# Patient Record
Sex: MALE | Race: WHITE | URBAN - METROPOLITAN AREA
[De-identification: names, ages, dates, MRNs, and addresses within clinical notes are randomized per-mention and may not be internally consistent; named-entity substitution may affect disease eponyms.]

---

## 2019-05-16 ENCOUNTER — TELEPHONE (OUTPATIENT)
Dept: FAMILY MEDICINE CLINIC | Facility: CLINIC | Age: 29
End: 2019-05-16

## 2019-05-16 NOTE — TELEPHONE ENCOUNTER
Received medical records request from patient requesting all medical records for continuation of care. Patient's chart has been pulled from storage and sent with authorization to [employer].  [facility] [phone]

## 2019-06-19 NOTE — PROGRESS NOTES
Aleksandar Ruffin is a 25year old male. S:  Patient presents today with the following concerns:  · No medications currently. Was a previous patient of Shamika Byrne. Has moved frequently as has been in the [employer].     · Interested in [facility] [place] denies chest pain on exertion  GI: denies abdominal pain.   No N/V/D/C  : denies dysuria  MUSCULOSKELETAL: denies back pain  NEURO: denies headaches    EXAM:  /84   Pulse 84   Temp 97.1 °F (36.2 °C)   Resp 16   Ht 67.25\"   Wt 174 lb 9.6 oz   BMI 27

## 2019-07-31 NOTE — PROGRESS NOTES
Berenice Gallagher is a 25year old male. HPI:   Patient presents for recheck of his ADHD. Patient has been using the stimulant medication, Adderall XR 20 mg, on a regular basis. Patient was last seen 2 months ago.   Medication changes at that time:  [facility] No        REVIEW OF SYSTEMS:   GENERAL HEALTH: feels well otherwise  RESPIRATORY: denies shortness of breath with exertion  CARDIOVASCULAR: denies chest pain, denies palpitations  GI: denies abdominal pain  NEURO: denies headaches    EXAM:   /78   Pu

## 2019-12-06 NOTE — PROGRESS NOTES
Pamela Garibay is a 30year old male. HPI:   Patient presents for recheck of his ADHD. Patient has been using the stimulant medication, Adderall XR 30 mg, on a regular basis. Patient was last seen 4 months ago.   Medication changes at that time:  [place] Drug use: No        REVIEW OF SYSTEMS:   GENERAL HEALTH: feels well otherwise  RESPIRATORY: denies shortness of breath with exertion  CARDIOVASCULAR: denies chest pain, denies palpitations  GI: denies abdominal pain  NEURO: denies headaches    EXAM:   BP 1

## 2020-04-23 NOTE — PROGRESS NOTES
Virtual Telephone Check-In    Daxa Dawn verbally consents to a Virtual/Telephone Check-In visit on 04/23/20. Patient understands and accepts financial responsibility for any deductible, co-insurance and/or co-pays associated with this service.

## 2020-07-31 NOTE — PROGRESS NOTES
Virtual Telephone Check-In    Faiza Prado verbally consents to a Virtual/Telephone Check-In visit on 07/31/20. Patient has been referred to the [facility] website at [email or web address] to review the yearly Consent to Treat document.     Patient underst

## 2021-01-07 NOTE — PROGRESS NOTES
Virtual Telephone Check-In    Faiza Prado verbally consents to a Virtual/Telephone Check-In visit on 01/07/21. Patient has been referred to the [facility] website at [email or web address] to review the yearly Consent to Treat document.     Patient underst

## 2021-05-10 ENCOUNTER — LAB ENCOUNTER (OUTPATIENT)
Dept: LAB | Age: 31
End: 2021-05-10
Attending: PHYSICIAN ASSISTANT
Payer: COMMERCIAL

## 2021-05-10 DIAGNOSIS — Z00.00 LABORATORY EXAMINATION ORDERED AS PART OF A COMPLETE PHYSICAL EXAMINATION: ICD-10-CM

## 2021-05-10 PROCEDURE — 80050 GENERAL HEALTH PANEL: CPT | Performed by: PHYSICIAN ASSISTANT

## 2021-05-10 PROCEDURE — 80061 LIPID PANEL: CPT | Performed by: PHYSICIAN ASSISTANT

## 2021-05-10 NOTE — PROGRESS NOTES
Patient presents with:  ADHD: 3 month adderall follow up       [facility]  Billy Tabares is a 26year old year old male who presents to clinic for ADD follow up. He has been taking adderall XR 25 mg and IR 10 mg daily.  Working well for hi level: Not on file    Tobacco Use      Smoking status: Former Smoker        Packs/day: 0.30        Types: Cigarettes        Start date: 6/20/2019      Smokeless tobacco: Never Used      Tobacco comment: pack a month    Vaping Use      Vaping Use: Never use

## 2021-05-14 ENCOUNTER — IMMUNIZATION (OUTPATIENT)
Dept: LAB | Facility: HOSPITAL | Age: 31
End: 2021-05-14
Attending: EMERGENCY MEDICINE
Payer: COMMERCIAL

## 2021-05-14 DIAGNOSIS — Z23 NEED FOR VACCINATION: Primary | ICD-10-CM

## 2021-05-14 PROCEDURE — 0001A SARSCOV2 VAC 30MCG/0.3ML IM: CPT

## 2021-06-05 ENCOUNTER — IMMUNIZATION (OUTPATIENT)
Dept: LAB | Facility: HOSPITAL | Age: 31
End: 2021-06-05
Attending: EMERGENCY MEDICINE
Payer: COMMERCIAL

## 2021-06-05 DIAGNOSIS — Z23 NEED FOR VACCINATION: Primary | ICD-10-CM

## 2021-06-05 PROCEDURE — 0002A SARSCOV2 VAC 30MCG/0.3ML IM: CPT

## 2021-08-11 NOTE — PROGRESS NOTES
Patient presents with:  ADHD: 3 month f/u Adderall  Abdominal Pain: c/o periodic epigastric pain beginning 1 1/2 weeks ago,disturbing sleep. Alternating between diarrhea and constipation       HISTORY OF PRESENT ILLNESS  Clayton Dukes is a 33year old mal hepatosplenomegaly. No CVA tenderness. PSYCH pleasant and cooperative. Normal thought process and content. ASSESSMENT/ PLAN  1. ADHD  [place] med[place] x [place] sooner as needed.     2. Epigastric pain  Suspect PUD/ gastritis

## 2021-09-09 ENCOUNTER — OFFICE VISIT (OUTPATIENT)
Dept: FAMILY MEDICINE CLINIC | Facility: CLINIC | Age: 31
End: 2021-09-09
Payer: COMMERCIAL

## 2021-09-09 ENCOUNTER — LAB ENCOUNTER (OUTPATIENT)
Dept: LAB | Age: 31
End: 2021-09-09
Attending: FAMILY MEDICINE
Payer: COMMERCIAL

## 2021-09-09 VITALS
DIASTOLIC BLOOD PRESSURE: 68 MMHG | WEIGHT: 161 LBS | RESPIRATION RATE: 16 BRPM | HEART RATE: 72 BPM | SYSTOLIC BLOOD PRESSURE: 100 MMHG | TEMPERATURE: 99 F | HEIGHT: 68 IN | BODY MASS INDEX: 24.4 KG/M2

## 2021-09-09 DIAGNOSIS — R10.13 EPIGASTRIC PAIN: Primary | ICD-10-CM

## 2021-09-09 DIAGNOSIS — R10.13 EPIGASTRIC PAIN: ICD-10-CM

## 2021-09-09 PROCEDURE — 3008F BODY MASS INDEX DOCD: CPT | Performed by: PHYSICIAN ASSISTANT

## 2021-09-09 PROCEDURE — 87338 HPYLORI STOOL AG IA: CPT | Performed by: PHYSICIAN ASSISTANT

## 2021-09-09 PROCEDURE — 3078F DIAST BP <80 MM HG: CPT | Performed by: PHYSICIAN ASSISTANT

## 2021-09-09 PROCEDURE — 99213 OFFICE O/P EST LOW 20 MIN: CPT | Performed by: PHYSICIAN ASSISTANT

## 2021-09-09 PROCEDURE — 3074F SYST BP LT 130 MM HG: CPT | Performed by: PHYSICIAN ASSISTANT

## 2021-09-09 NOTE — PROGRESS NOTES
Patient presents with:  Abdominal Pain: x1 month       HISTORY OF PRESENT ILLNESS  Jenny Lowery is a 33year old male who presents for alternating abdominal pains. RUQ vs epigastric vs mid abdomen. Sometimes seems to affect appetite.  Thought that the om pain  Will check H pylori and let him know results once they return. Needs to keep monitoring for potential food triggers. Notify us if he gets any new symptoms. Patient expresses understanding and agreement with above plan.   Rosario Cohen PA-C    Addm:

## 2021-09-11 LAB — HELICOBACTER PYLORI AG, FECAL: POSITIVE

## 2021-11-15 NOTE — PROGRESS NOTES
Patient presents with:  ADHD      HISTORY OF PRESENT ILLNESS  Esteban Martinez is a 33year old year old male who presents to clinic for ADD follow up. He has been on Adderall ER 25 mg for some time.  He has been doing well on his medications and has been ta Exercise: No        Seat Belt: Yes      Wt Readings from Last 6 Encounters:  11/11/21 : 160 lb (72.6 kg)  09/09/21 : 161 lb (73 kg)  08/11/21 : 159 lb 3.2 oz (72.2 kg)  05/06/21 : 169 lb 6 oz (76.8 kg)  12/06/19 : 165 lb 6.4 oz (75 kg)  07/31/19 : 169 l

## 2022-02-10 ENCOUNTER — TELEPHONE (OUTPATIENT)
Dept: FAMILY MEDICINE CLINIC | Facility: CLINIC | Age: 32
End: 2022-02-10

## 2022-02-10 DIAGNOSIS — Z00.00 LABORATORY EXAMINATION ORDERED AS PART OF A COMPLETE PHYSICAL EXAMINATION: Primary | ICD-10-CM

## 2022-02-10 NOTE — TELEPHONE ENCOUNTER
Please enter lab orders for the patient's upcoming physical appointment. Physical scheduled: Your appointments     Date & Time Appointment Department [facility])    Feb 17, 2022  8:00 AM CST Physical - Established with CHRISTOPHER Silverman [facility], [address], [place]  ([facility])            [address] [facility] [address] [place] [address] [phone]         [facility]: [facility])     The patient has been notified to complete fasting labs prior to their physical appointment.

## 2022-02-14 ENCOUNTER — LAB ENCOUNTER (OUTPATIENT)
Dept: LAB | Age: 32
End: 2022-02-14
Attending: PHYSICIAN ASSISTANT
Payer: COMMERCIAL

## 2022-02-14 DIAGNOSIS — Z00.00 LABORATORY EXAMINATION ORDERED AS PART OF A COMPLETE PHYSICAL EXAMINATION: ICD-10-CM

## 2022-02-14 LAB
ALBUMIN SERPL-MCNC: 3.8 G/DL (ref 3.4–5)
ALBUMIN/GLOB SERPL: 1.2 {RATIO} (ref 1–2)
ALP LIVER SERPL-CCNC: 48 U/L
ALT SERPL-CCNC: 18 U/L
ANION GAP SERPL CALC-SCNC: 4 MMOL/L (ref 0–18)
AST SERPL-CCNC: 12 U/L (ref 15–37)
BASOPHILS # BLD AUTO: 0.06 X10(3) UL (ref 0–0.2)
BASOPHILS NFR BLD AUTO: 0.9 %
BILIRUB SERPL-MCNC: 0.8 MG/DL (ref 0.1–2)
BUN BLD-MCNC: 8 MG/DL (ref 7–18)
CALCIUM BLD-MCNC: 9.2 MG/DL (ref 8.5–10.1)
CHLORIDE SERPL-SCNC: 109 MMOL/L (ref 98–112)
CHOLEST SERPL-MCNC: 207 MG/DL (ref ?–200)
CO2 SERPL-SCNC: 30 MMOL/L (ref 21–32)
CREAT BLD-MCNC: 1.09 MG/DL
EOSINOPHIL # BLD AUTO: 0.15 X10(3) UL (ref 0–0.7)
EOSINOPHIL NFR BLD AUTO: 2.3 %
ERYTHROCYTE [DISTWIDTH] IN BLOOD BY AUTOMATED COUNT: 12.7 %
FASTING PATIENT LIPID ANSWER: YES
FASTING STATUS PATIENT QL REPORTED: YES
GLOBULIN PLAS-MCNC: 3.1 G/DL (ref 2.8–4.4)
GLUCOSE BLD-MCNC: 84 MG/DL (ref 70–99)
HCT VFR BLD AUTO: 47.4 %
HDLC SERPL-MCNC: 57 MG/DL (ref 40–59)
HGB BLD-MCNC: 15.6 G/DL
IMM GRANULOCYTES # BLD AUTO: 0.02 X10(3) UL (ref 0–1)
IMM GRANULOCYTES NFR BLD: 0.3 %
LDLC SERPL CALC-MCNC: 131 MG/DL (ref ?–100)
LYMPHOCYTES # BLD AUTO: 2.88 X10(3) UL (ref 1–4)
LYMPHOCYTES NFR BLD AUTO: 44.6 %
MCH RBC QN AUTO: 29.3 PG (ref 26–34)
MCHC RBC AUTO-ENTMCNC: 32.9 G/DL (ref 31–37)
MONOCYTES # BLD AUTO: 0.58 X10(3) UL (ref 0.1–1)
MONOCYTES NFR BLD AUTO: 9 %
NEUTROPHILS # BLD AUTO: 2.77 X10 (3) UL (ref 1.5–7.7)
NEUTROPHILS # BLD AUTO: 2.77 X10(3) UL (ref 1.5–7.7)
NEUTROPHILS NFR BLD AUTO: 42.9 %
NONHDLC SERPL-MCNC: 150 MG/DL (ref ?–130)
OSMOLALITY SERPL CALC.SUM OF ELEC: 294 MOSM/KG (ref 275–295)
PLATELET # BLD AUTO: 254 10(3)UL (ref 150–450)
POTASSIUM SERPL-SCNC: 4.1 MMOL/L (ref 3.5–5.1)
PROT SERPL-MCNC: 6.9 G/DL (ref 6.4–8.2)
RBC # BLD AUTO: 5.32 X10(6)UL
SODIUM SERPL-SCNC: 143 MMOL/L (ref 136–145)
TRIGL SERPL-MCNC: 106 MG/DL (ref 30–149)
TSI SER-ACNC: 2.13 MIU/ML (ref 0.36–3.74)
VLDLC SERPL CALC-MCNC: 19 MG/DL (ref 0–30)
WBC # BLD AUTO: 6.5 X10(3) UL (ref 4–11)

## 2022-02-14 PROCEDURE — 80050 GENERAL HEALTH PANEL: CPT | Performed by: PHYSICIAN ASSISTANT

## 2022-02-14 PROCEDURE — 80061 LIPID PANEL: CPT | Performed by: PHYSICIAN ASSISTANT

## 2022-02-17 ENCOUNTER — OFFICE VISIT (OUTPATIENT)
Dept: FAMILY MEDICINE CLINIC | Facility: CLINIC | Age: 32
End: 2022-02-17
Payer: COMMERCIAL

## 2022-02-17 VITALS
RESPIRATION RATE: 16 BRPM | HEIGHT: 67.2 IN | WEIGHT: 162 LBS | SYSTOLIC BLOOD PRESSURE: 100 MMHG | HEART RATE: 90 BPM | DIASTOLIC BLOOD PRESSURE: 66 MMHG | TEMPERATURE: 98 F | BODY MASS INDEX: 25.13 KG/M2

## 2022-02-17 DIAGNOSIS — Z00.00 WELL ADULT EXAM: Primary | ICD-10-CM

## 2022-02-17 DIAGNOSIS — H93.12 TINNITUS OF LEFT EAR: ICD-10-CM

## 2022-02-17 DIAGNOSIS — F90.0 ATTENTION DEFICIT HYPERACTIVITY DISORDER (ADHD), PREDOMINANTLY INATTENTIVE TYPE: ICD-10-CM

## 2022-02-17 PROCEDURE — 3008F BODY MASS INDEX DOCD: CPT | Performed by: PHYSICIAN ASSISTANT

## 2022-02-17 PROCEDURE — 3074F SYST BP LT 130 MM HG: CPT | Performed by: PHYSICIAN ASSISTANT

## 2022-02-17 PROCEDURE — 3078F DIAST BP <80 MM HG: CPT | Performed by: PHYSICIAN ASSISTANT

## 2022-02-17 PROCEDURE — 99395 PREV VISIT EST AGE 18-39: CPT | Performed by: PHYSICIAN ASSISTANT

## 2022-02-17 RX ORDER — DEXTROAMPHETAMINE SACCHARATE, AMPHETAMINE ASPARTATE MONOHYDRATE, DEXTROAMPHETAMINE SULFATE AND AMPHETAMINE SULFATE 6.25; 6.25; 6.25; 6.25 MG/1; MG/1; MG/1; MG/1
25 CAPSULE, EXTENDED RELEASE ORAL DAILY
Qty: 30 CAPSULE | Refills: 0 | Status: SHIPPED | OUTPATIENT
Start: 2022-02-17 | End: 2022-03-19

## 2022-02-17 RX ORDER — DEXTROAMPHETAMINE SACCHARATE, AMPHETAMINE ASPARTATE MONOHYDRATE, DEXTROAMPHETAMINE SULFATE AND AMPHETAMINE SULFATE 6.25; 6.25; 6.25; 6.25 MG/1; MG/1; MG/1; MG/1
25 CAPSULE, EXTENDED RELEASE ORAL DAILY
Qty: 30 CAPSULE | Refills: 0 | Status: CANCELLED | OUTPATIENT
Start: 2022-02-17 | End: 2022-03-19

## 2022-02-17 RX ORDER — DEXTROAMPHETAMINE SACCHARATE, AMPHETAMINE ASPARTATE MONOHYDRATE, DEXTROAMPHETAMINE SULFATE AND AMPHETAMINE SULFATE 6.25; 6.25; 6.25; 6.25 MG/1; MG/1; MG/1; MG/1
25 CAPSULE, EXTENDED RELEASE ORAL DAILY
Qty: 30 CAPSULE | Refills: 0 | Status: SHIPPED | OUTPATIENT
Start: 2022-03-20 | End: 2022-04-20

## 2022-02-17 RX ORDER — DEXTROAMPHETAMINE SACCHARATE, AMPHETAMINE ASPARTATE MONOHYDRATE, DEXTROAMPHETAMINE SULFATE AND AMPHETAMINE SULFATE 6.25; 6.25; 6.25; 6.25 MG/1; MG/1; MG/1; MG/1
25 CAPSULE, EXTENDED RELEASE ORAL DAILY
Qty: 30 CAPSULE | Refills: 0 | Status: SHIPPED | OUTPATIENT
Start: 2022-04-20 | End: 2022-05-20

## 2022-06-01 ENCOUNTER — TELEMEDICINE (OUTPATIENT)
Dept: FAMILY MEDICINE CLINIC | Facility: CLINIC | Age: 32
End: 2022-06-01
Payer: COMMERCIAL

## 2022-06-01 DIAGNOSIS — U07.1 COVID-19 VIRUS INFECTION: Primary | ICD-10-CM

## 2022-06-01 PROCEDURE — 99213 OFFICE O/P EST LOW 20 MIN: CPT | Performed by: FAMILY MEDICINE

## 2022-06-01 NOTE — PROGRESS NOTES
Zhao Vazquez is a 33year old male coming in for had concerns including Covid (tested postive, would like to discuss about how to get better and what precaution that will be taken). Subjective:   HPI ST and starting to feel better. Swelling in throat. Strep in sister and frind had positive COVVID test. Positive Sunday as well. Subday AM noted symptoms. ROS: GENERAL HEALTH: feels well otherwise  This visit is conducted using Telemedicine with live, interactive video and audio. Patient has been referred to the [facility] website at [email or web address] to review the yearly Consent to Treat document. Patient understands and accepts financial responsibility for any deductible, co-insurance and/or co-pays associated with this service. Objective: There were no vitals taken for this visit. There is no height or weight on file to calculate BMI. Physical Exam  Constitutional:       Appearance: He is well-developed. HENT:      Head: Normocephalic and atraumatic. Pulmonary:      Effort: Pulmonary effort is normal. No respiratory distress. Musculoskeletal:         General: Normal range of motion. Cervical back: Normal range of motion. Skin:     Findings: No rash. Neurological:      Mental Status: He is alert and oriented to person, place, and time. Psychiatric:         Mood and Affect: Mood normal.         Behavior: Behavior normal.         Thought Content: Thought content normal.         Judgment: Judgment normal.           Assessment & Plan:   1. COVID-19 virus infection (Primary)     I am having Jeanette Vilchisu maintain his Amphetamine-Dextroamphet ER, Amphetamine-Dextroamphet ER, and Amphetamine-Dextroamphet ER. Early symptoms, he appears to be doing well and does not need any specific notes for work or home. Isolation is recommended and follow-up as previously scheduled for his ADHD  No follow-ups on file.     Erna Najera MD, 6/1/2022, 9:56 AM

## 2022-09-16 RX ORDER — ATOMOXETINE 40 MG/1
40 CAPSULE ORAL DAILY
Qty: 30 CAPSULE | Refills: 0 | OUTPATIENT
Start: 2022-09-16

## 2022-10-11 RX ORDER — ATOMOXETINE 60 MG/1
60 CAPSULE ORAL
Qty: 30 CAPSULE | Refills: 0 | Status: SHIPPED | OUTPATIENT
Start: 2022-10-11 | End: 2022-11-10

## 2022-11-09 RX ORDER — ATOMOXETINE 60 MG/1
60 CAPSULE ORAL
Qty: 30 CAPSULE | Refills: 0 | Status: SHIPPED | OUTPATIENT
Start: 2022-11-09 | End: 2022-12-09

## 2022-11-09 NOTE — TELEPHONE ENCOUNTER
Can we have him set up appt for next month or so? I'd like to see him before I went on maternity leave to make sure med is working ok and refills are set up before leaving.

## 2022-11-18 ENCOUNTER — OFFICE VISIT (OUTPATIENT)
Dept: FAMILY MEDICINE CLINIC | Facility: CLINIC | Age: 32
End: 2022-11-18
Payer: COMMERCIAL

## 2022-11-18 VITALS
HEIGHT: 67.2 IN | WEIGHT: 169 LBS | SYSTOLIC BLOOD PRESSURE: 120 MMHG | RESPIRATION RATE: 16 BRPM | DIASTOLIC BLOOD PRESSURE: 82 MMHG | BODY MASS INDEX: 26.22 KG/M2

## 2022-11-18 DIAGNOSIS — F90.1 ADHD (ATTENTION DEFICIT HYPERACTIVITY DISORDER), PREDOMINANTLY HYPERACTIVE IMPULSIVE TYPE: ICD-10-CM

## 2022-11-18 DIAGNOSIS — R53.83 FATIGUE, UNSPECIFIED TYPE: Primary | ICD-10-CM

## 2022-11-18 PROCEDURE — 99213 OFFICE O/P EST LOW 20 MIN: CPT | Performed by: PHYSICIAN ASSISTANT

## 2022-11-18 PROCEDURE — 3008F BODY MASS INDEX DOCD: CPT | Performed by: PHYSICIAN ASSISTANT

## 2022-11-18 PROCEDURE — 3074F SYST BP LT 130 MM HG: CPT | Performed by: PHYSICIAN ASSISTANT

## 2022-11-18 PROCEDURE — 3079F DIAST BP 80-89 MM HG: CPT | Performed by: PHYSICIAN ASSISTANT

## 2022-11-23 ENCOUNTER — LAB ENCOUNTER (OUTPATIENT)
Dept: LAB | Age: 32
End: 2022-11-23
Attending: PHYSICIAN ASSISTANT
Payer: COMMERCIAL

## 2022-11-23 DIAGNOSIS — R53.83 FATIGUE, UNSPECIFIED TYPE: ICD-10-CM

## 2022-11-23 LAB — TESTOST SERPL-MCNC: 521.34 NG/DL

## 2022-11-23 PROCEDURE — 84403 ASSAY OF TOTAL TESTOSTERONE: CPT | Performed by: PHYSICIAN ASSISTANT

## 2023-03-31 DIAGNOSIS — F90.1 ADHD (ATTENTION DEFICIT HYPERACTIVITY DISORDER), PREDOMINANTLY HYPERACTIVE IMPULSIVE TYPE: ICD-10-CM

## 2023-03-31 RX ORDER — DEXTROAMPHETAMINE SACCHARATE, AMPHETAMINE ASPARTATE MONOHYDRATE, DEXTROAMPHETAMINE SULFATE AND AMPHETAMINE SULFATE 6.25; 6.25; 6.25; 6.25 MG/1; MG/1; MG/1; MG/1
25 CAPSULE, EXTENDED RELEASE ORAL DAILY
Qty: 30 CAPSULE | Refills: 0 | Status: SHIPPED | OUTPATIENT
Start: 2023-03-31 | End: 2023-05-01

## 2023-03-31 NOTE — TELEPHONE ENCOUNTER
Pt calling to have Rx called into a new pharmacy Amphetamine-Dextroamphet ER (ADDERALL XR) 25 MG Oral Capsule SR 24 Hr    [place] [address]  [phone]

## 2023-08-25 PROBLEM — F43.10 PTSD (POST-TRAUMATIC STRESS DISORDER): Status: ACTIVE | Noted: 2023-08-25

## (undated) NOTE — LETTER
22  [facility]  Stimulant Agreement    The purpose of this Agreement is to prevent misunderstandings about certain medications you will be taking. This is to help you and your doctor to comply with the law regarding controlled pharmaceuticals. You must read the following and place your initials to the left of each paragraph, indicating your understanding and are in agreement to each paragraph. Esthelaedwardo Flo,  1990 understand that in order to receive stimulant medication for the treatment of attentional disorder at [place] [address] [address]. I must comply with all of the following conditions. _______ I understand that this Agreement is essential to the trust and confidence necessary in a                  doctor/patient relationship and that my prescribing doctor undertakes to treat me based on this    agreement.    _______ I understand that if I break this Agreement in any fashion, my doctor may stop prescribing    stimulant medications and I may be dismissed from the practice. _______ If I am to stop this medication for any reason, my prescribing doctor will taper off the medication    over a period of several days, as necessary, to avoid withdrawal symptoms. Depending on the situation, a drug dependence treatment program may be recommended.    _______ If recommended by my prescribing doctor, I will seek psychiatric treatment, psychotherapy,    and/or psychological treatment.    _______ I will communicate fully with my prescribing doctor regarding the effect of the medication on   my daily life, including but not limited to how well medicine is helping me concentrate and focus   on the task at hand.    _______ I will not use any illegal substances, including marijuana, cocaine, etc., nor will I misuse or    self-prescribe or self-medicate with legal controlled substances not prescribed to me.  I promise to    let my physician know of any medications prescribed to me by another provider after  the date of    this agreement. Use of alcohol is contraindicated; if I drink alcohol, I promise to limit my    consumption to 1-2 drinks and to not drive or operate machinery. _______ I will not share my medication with anyone. _______ I agree I will use my medicine at a rate no greater than the prescribed rate and that use of my    medicine at a greater rate will result in me being without medication for a period of time. _______ I will not accept any stimulant medication prescriptions from any other prescriber. _______ I will be responsible for paying attention that I do not run out of my medications on weekends    and holidays and to give 72 hour notice for refill requests because abrupt discontinuation of these    medications can cause severe withdrawal syndrome. _______ I understand that I must keep my medications in a safe place and I will not be supplied additional    refills for the medications  that I may lose. If stimulant medication was stolen I must submit a    police report of the theft to my doctor to get additional refills. I acknowledge that my doctor is    NOT obligated to refill my prescription under these circumstances but s/he may at his/her    discretion. _______ I agree to follow these guidelines that have been fully explained to me.    _______ The risks and potential benefits of these therapies have been explained to me and I acknowledge that I have received additional materials. _______ All of my questions and concerns regarding treatment have been adequately answered.     _______ A copy of this agreement has been given to me    This Agreement is entered into on this day 2/17/2022    __________  ___________  _________________________________________________________  Date   Time   Signature of Patient or Authorized Representative (with relationship to the Patient)  The Patient/Authorized Representative has read this form or had it read to him/her, states that he/she understands this information and has no further questions.

## (undated) NOTE — LETTER
[facility]  Stimulant Agreement    The purpose of this Agreement is to prevent misunderstandings about certain medications you will be taking. This is to help you and your doctor to comply with the law regarding controlled pharmaceuticals.  You mu after  the date of    this agreement. Use of alcohol is contraindicated; if I drink alcohol, I promise to limit my    consumption to 1-2 drinks and to not drive or operate machinery. _______ I will not share my medication with anyone.     _______ I agree he/she understands this information and has no further questions.

## (undated) NOTE — LETTER
[facility], [facility], [address] [place] [id number] [phone]   [facility]  Stimulant Agreement    The purpose of this Agreement is to prevent misunderstandings about certain medications self-prescribe or self-medicate with legal controlled substances not prescribed to me. I promise to    let my physician know of any medications prescribed to me by another  provider after  the date of    this agreement.  Use of alcohol is contraindicated; Date   Time   Signature of Patient or Authorized Representative (with relationship to the Patient)  The Patient/Authorized Representative has read this form or had it read to him/her, states that he/she understands this information and has no further quest